# Patient Record
Sex: MALE | Race: WHITE | Employment: FULL TIME | URBAN - METROPOLITAN AREA
[De-identification: names, ages, dates, MRNs, and addresses within clinical notes are randomized per-mention and may not be internally consistent; named-entity substitution may affect disease eponyms.]

---

## 2017-01-01 ENCOUNTER — DOCUMENTATION ONLY (OUTPATIENT)
Dept: LAB | Facility: CLINIC | Age: 57
End: 2017-01-01

## 2017-01-01 ENCOUNTER — OFFICE VISIT (OUTPATIENT)
Dept: ORTHOPEDICS | Facility: CLINIC | Age: 57
End: 2017-01-01
Payer: COMMERCIAL

## 2017-01-01 ENCOUNTER — THERAPY VISIT (OUTPATIENT)
Dept: OCCUPATIONAL THERAPY | Facility: CLINIC | Age: 57
End: 2017-01-01
Payer: COMMERCIAL

## 2017-01-01 ENCOUNTER — RADIANT APPOINTMENT (OUTPATIENT)
Dept: GENERAL RADIOLOGY | Facility: CLINIC | Age: 57
End: 2017-01-01
Attending: FAMILY MEDICINE
Payer: COMMERCIAL

## 2017-01-01 ENCOUNTER — OFFICE VISIT (OUTPATIENT)
Dept: PEDIATRICS | Facility: CLINIC | Age: 57
End: 2017-01-01
Payer: COMMERCIAL

## 2017-01-01 ENCOUNTER — TELEPHONE (OUTPATIENT)
Dept: TRANSPLANT | Facility: CLINIC | Age: 57
End: 2017-01-01

## 2017-01-01 ENCOUNTER — CARE COORDINATION (OUTPATIENT)
Dept: NEPHROLOGY | Facility: CLINIC | Age: 57
End: 2017-01-01

## 2017-01-01 ENCOUNTER — OFFICE VISIT (OUTPATIENT)
Dept: NEPHROLOGY | Facility: CLINIC | Age: 57
End: 2017-01-01
Attending: INTERNAL MEDICINE
Payer: COMMERCIAL

## 2017-01-01 ENCOUNTER — TELEPHONE (OUTPATIENT)
Dept: NEPHROLOGY | Facility: CLINIC | Age: 57
End: 2017-01-01

## 2017-01-01 VITALS
HEIGHT: 65 IN | BODY MASS INDEX: 30.32 KG/M2 | HEART RATE: 82 BPM | DIASTOLIC BLOOD PRESSURE: 98 MMHG | SYSTOLIC BLOOD PRESSURE: 142 MMHG | WEIGHT: 182 LBS | OXYGEN SATURATION: 95 %

## 2017-01-01 VITALS
SYSTOLIC BLOOD PRESSURE: 124 MMHG | WEIGHT: 180 LBS | DIASTOLIC BLOOD PRESSURE: 82 MMHG | BODY MASS INDEX: 29.99 KG/M2 | HEIGHT: 65 IN

## 2017-01-01 VITALS
WEIGHT: 181 LBS | HEIGHT: 65 IN | BODY MASS INDEX: 30.16 KG/M2 | DIASTOLIC BLOOD PRESSURE: 82 MMHG | OXYGEN SATURATION: 96 % | TEMPERATURE: 97.9 F | SYSTOLIC BLOOD PRESSURE: 132 MMHG | HEART RATE: 76 BPM

## 2017-01-01 DIAGNOSIS — M79.644 THUMB PAIN, RIGHT: ICD-10-CM

## 2017-01-01 DIAGNOSIS — Z11.59 NEED FOR HEPATITIS C SCREENING TEST: ICD-10-CM

## 2017-01-01 DIAGNOSIS — M65.311 TRIGGER FINGER OF RIGHT THUMB: ICD-10-CM

## 2017-01-01 DIAGNOSIS — T86.10 COMPLICATION OF TRANSPLANTED KIDNEY, UNSPECIFIED COMPLICATION: ICD-10-CM

## 2017-01-01 DIAGNOSIS — I15.1 HYPERTENSION SECONDARY TO OTHER RENAL DISORDERS: ICD-10-CM

## 2017-01-01 DIAGNOSIS — Z94.0 KIDNEY REPLACED BY TRANSPLANT: ICD-10-CM

## 2017-01-01 DIAGNOSIS — E78.2 MIXED HYPERLIPIDEMIA: ICD-10-CM

## 2017-01-01 DIAGNOSIS — Z79.899 ENCOUNTER FOR LONG-TERM CURRENT USE OF MEDICATION: ICD-10-CM

## 2017-01-01 DIAGNOSIS — Z48.298 AFTERCARE FOLLOWING ORGAN TRANSPLANT: ICD-10-CM

## 2017-01-01 DIAGNOSIS — Z94.0 KIDNEY REPLACED BY TRANSPLANT: Primary | ICD-10-CM

## 2017-01-01 DIAGNOSIS — Z94.0 KIDNEY TRANSPLANTED: Primary | ICD-10-CM

## 2017-01-01 DIAGNOSIS — Z94.0 KIDNEY TRANSPLANTED: ICD-10-CM

## 2017-01-01 DIAGNOSIS — D84.9 IMMUNOSUPPRESSED STATUS (H): ICD-10-CM

## 2017-01-01 DIAGNOSIS — Z00.01 ENCOUNTER FOR GENERAL ADULT MEDICAL EXAMINATION WITH ABNORMAL FINDINGS: ICD-10-CM

## 2017-01-01 DIAGNOSIS — M65.4 RADIAL STYLOID TENOSYNOVITIS: Primary | ICD-10-CM

## 2017-01-01 DIAGNOSIS — M79.644 THUMB PAIN, RIGHT: Primary | ICD-10-CM

## 2017-01-01 DIAGNOSIS — M79.644 PAIN OF RIGHT THUMB: Primary | ICD-10-CM

## 2017-01-01 DIAGNOSIS — Z23 NEED FOR TDAP VACCINATION: ICD-10-CM

## 2017-01-01 DIAGNOSIS — I15.1 HYPERTENSION SECONDARY TO OTHER RENAL DISORDERS (CODE): Primary | ICD-10-CM

## 2017-01-01 DIAGNOSIS — B02.9 SHINGLES: ICD-10-CM

## 2017-01-01 DIAGNOSIS — M65.311 TRIGGER THUMB OF RIGHT HAND: ICD-10-CM

## 2017-01-01 DIAGNOSIS — I10 HYPERTENSION: ICD-10-CM

## 2017-01-01 DIAGNOSIS — E83.42 HYPOMAGNESEMIA: ICD-10-CM

## 2017-01-01 DIAGNOSIS — M79.644 PAIN OF RIGHT THUMB: ICD-10-CM

## 2017-01-01 DIAGNOSIS — E78.2 MIXED HYPERLIPIDEMIA: Primary | ICD-10-CM

## 2017-01-01 LAB
ANION GAP SERPL CALCULATED.3IONS-SCNC: 11 MMOL/L (ref 3–14)
ANION GAP SERPL CALCULATED.3IONS-SCNC: 13 MMOL/L (ref 3–14)
ANION GAP SERPL CALCULATED.3IONS-SCNC: 8 MMOL/L (ref 3–14)
ANION GAP SERPL CALCULATED.3IONS-SCNC: 9 MMOL/L (ref 3–14)
BUN SERPL-MCNC: 15 MG/DL (ref 7–30)
BUN SERPL-MCNC: 16 MG/DL (ref 7–30)
BUN SERPL-MCNC: 16 MG/DL (ref 7–30)
BUN SERPL-MCNC: 21 MG/DL (ref 7–30)
CALCIUM SERPL-MCNC: 9.1 MG/DL (ref 8.5–10.1)
CALCIUM SERPL-MCNC: 9.4 MG/DL (ref 8.5–10.1)
CALCIUM SERPL-MCNC: 9.4 MG/DL (ref 8.5–10.1)
CALCIUM SERPL-MCNC: 9.8 MG/DL (ref 8.5–10.1)
CHLORIDE SERPL-SCNC: 106 MMOL/L (ref 94–109)
CHLORIDE SERPL-SCNC: 107 MMOL/L (ref 94–109)
CHLORIDE SERPL-SCNC: 107 MMOL/L (ref 94–109)
CHLORIDE SERPL-SCNC: 108 MMOL/L (ref 94–109)
CO2 SERPL-SCNC: 21 MMOL/L (ref 20–32)
CO2 SERPL-SCNC: 22 MMOL/L (ref 20–32)
CO2 SERPL-SCNC: 23 MMOL/L (ref 20–32)
CO2 SERPL-SCNC: 25 MMOL/L (ref 20–32)
CREAT SERPL-MCNC: 1.19 MG/DL (ref 0.66–1.25)
CREAT SERPL-MCNC: 1.2 MG/DL (ref 0.66–1.25)
CREAT SERPL-MCNC: 1.26 MG/DL (ref 0.66–1.25)
CREAT SERPL-MCNC: 1.32 MG/DL (ref 0.66–1.25)
ERYTHROCYTE [DISTWIDTH] IN BLOOD BY AUTOMATED COUNT: 13.3 % (ref 10–15)
ERYTHROCYTE [DISTWIDTH] IN BLOOD BY AUTOMATED COUNT: 13.8 % (ref 10–15)
ERYTHROCYTE [DISTWIDTH] IN BLOOD BY AUTOMATED COUNT: 14.2 % (ref 10–15)
GFR SERPL CREATININE-BSD FRML MDRD: 56 ML/MIN/1.7M2
GFR SERPL CREATININE-BSD FRML MDRD: 59 ML/MIN/1.7M2
GFR SERPL CREATININE-BSD FRML MDRD: 63 ML/MIN/1.7M2
GFR SERPL CREATININE-BSD FRML MDRD: 63 ML/MIN/1.7M2
GLUCOSE SERPL-MCNC: 102 MG/DL (ref 70–99)
GLUCOSE SERPL-MCNC: 89 MG/DL (ref 70–99)
GLUCOSE SERPL-MCNC: 93 MG/DL (ref 70–99)
GLUCOSE SERPL-MCNC: 98 MG/DL (ref 70–99)
HCT VFR BLD AUTO: 41.8 % (ref 40–53)
HCT VFR BLD AUTO: 42.2 % (ref 40–53)
HCT VFR BLD AUTO: 42.9 % (ref 40–53)
HCV AB SERPL QL IA: NORMAL
HGB BLD-MCNC: 14 G/DL (ref 13.3–17.7)
HGB BLD-MCNC: 14.3 G/DL (ref 13.3–17.7)
HGB BLD-MCNC: 14.5 G/DL (ref 13.3–17.7)
MAGNESIUM SERPL-MCNC: 2.3 MG/DL (ref 1.6–2.3)
MCH RBC QN AUTO: 28.8 PG (ref 26.5–33)
MCH RBC QN AUTO: 29 PG (ref 26.5–33)
MCH RBC QN AUTO: 29.1 PG (ref 26.5–33)
MCHC RBC AUTO-ENTMCNC: 33.5 G/DL (ref 31.5–36.5)
MCHC RBC AUTO-ENTMCNC: 33.8 G/DL (ref 31.5–36.5)
MCHC RBC AUTO-ENTMCNC: 33.9 G/DL (ref 31.5–36.5)
MCV RBC AUTO: 85 FL (ref 78–100)
MCV RBC AUTO: 86 FL (ref 78–100)
MCV RBC AUTO: 87 FL (ref 78–100)
PLATELET # BLD AUTO: 191 10E9/L (ref 150–450)
PLATELET # BLD AUTO: 196 10E9/L (ref 150–450)
PLATELET # BLD AUTO: 199 10E9/L (ref 150–450)
POTASSIUM SERPL-SCNC: 4.1 MMOL/L (ref 3.4–5.3)
POTASSIUM SERPL-SCNC: 4.2 MMOL/L (ref 3.4–5.3)
POTASSIUM SERPL-SCNC: 4.3 MMOL/L (ref 3.4–5.3)
POTASSIUM SERPL-SCNC: 4.4 MMOL/L (ref 3.4–5.3)
PROT UR-MCNC: 0.12 G/L
PROT UR-MCNC: <0.05 G/L
PROT/CREAT 24H UR: 0.12 G/G CR (ref 0–0.2)
PROT/CREAT 24H UR: NORMAL G/G CR (ref 0–0.2)
RBC # BLD AUTO: 4.83 10E12/L (ref 4.4–5.9)
RBC # BLD AUTO: 4.91 10E12/L (ref 4.4–5.9)
RBC # BLD AUTO: 5.03 10E12/L (ref 4.4–5.9)
SIROLIMUS BLD-MCNC: 3.3 UG/L (ref 5–15)
SIROLIMUS BLD-MCNC: 4 UG/L (ref 5–15)
SIROLIMUS BLD-MCNC: 4.3 UG/L (ref 5–15)
SIROLIMUS BLD-MCNC: 7.3 UG/L (ref 5–15)
SODIUM SERPL-SCNC: 138 MMOL/L (ref 133–144)
SODIUM SERPL-SCNC: 140 MMOL/L (ref 133–144)
SODIUM SERPL-SCNC: 141 MMOL/L (ref 133–144)
SODIUM SERPL-SCNC: 141 MMOL/L (ref 133–144)
TME LAST DOSE: ABNORMAL H
TME LAST DOSE: NORMAL H
WBC # BLD AUTO: 4.8 10E9/L (ref 4–11)
WBC # BLD AUTO: 5.4 10E9/L (ref 4–11)
WBC # BLD AUTO: 6 10E9/L (ref 4–11)

## 2017-01-01 PROCEDURE — 99243 OFF/OP CNSLTJ NEW/EST LOW 30: CPT | Mod: 25 | Performed by: FAMILY MEDICINE

## 2017-01-01 PROCEDURE — 99213 OFFICE O/P EST LOW 20 MIN: CPT | Mod: 25 | Performed by: PEDIATRICS

## 2017-01-01 PROCEDURE — 97165 OT EVAL LOW COMPLEX 30 MIN: CPT | Mod: GO | Performed by: OCCUPATIONAL THERAPIST

## 2017-01-01 PROCEDURE — 80048 BASIC METABOLIC PNL TOTAL CA: CPT | Performed by: INTERNAL MEDICINE

## 2017-01-01 PROCEDURE — 97110 THERAPEUTIC EXERCISES: CPT | Mod: GO | Performed by: OCCUPATIONAL THERAPIST

## 2017-01-01 PROCEDURE — 97140 MANUAL THERAPY 1/> REGIONS: CPT | Mod: GO | Performed by: OCCUPATIONAL THERAPIST

## 2017-01-01 PROCEDURE — 76882 US LMTD JT/FCL EVL NVASC XTR: CPT | Performed by: FAMILY MEDICINE

## 2017-01-01 PROCEDURE — 36415 COLL VENOUS BLD VENIPUNCTURE: CPT | Performed by: INTERNAL MEDICINE

## 2017-01-01 PROCEDURE — 80195 ASSAY OF SIROLIMUS: CPT | Mod: 90 | Performed by: INTERNAL MEDICINE

## 2017-01-01 PROCEDURE — 85027 COMPLETE CBC AUTOMATED: CPT | Performed by: INTERNAL MEDICINE

## 2017-01-01 PROCEDURE — 80195 ASSAY OF SIROLIMUS: CPT | Performed by: INTERNAL MEDICINE

## 2017-01-01 PROCEDURE — 99396 PREV VISIT EST AGE 40-64: CPT | Mod: 25 | Performed by: PEDIATRICS

## 2017-01-01 PROCEDURE — 90715 TDAP VACCINE 7 YRS/> IM: CPT | Performed by: PEDIATRICS

## 2017-01-01 PROCEDURE — 97035 APP MDLTY 1+ULTRASOUND EA 15: CPT | Mod: GO | Performed by: OCCUPATIONAL THERAPIST

## 2017-01-01 PROCEDURE — 86803 HEPATITIS C AB TEST: CPT | Performed by: PEDIATRICS

## 2017-01-01 PROCEDURE — 36415 COLL VENOUS BLD VENIPUNCTURE: CPT | Performed by: PEDIATRICS

## 2017-01-01 PROCEDURE — 84156 ASSAY OF PROTEIN URINE: CPT | Performed by: INTERNAL MEDICINE

## 2017-01-01 PROCEDURE — 90471 IMMUNIZATION ADMIN: CPT | Performed by: PEDIATRICS

## 2017-01-01 PROCEDURE — 99212 OFFICE O/P EST SF 10 MIN: CPT | Mod: ZF

## 2017-01-01 PROCEDURE — 73130 X-RAY EXAM OF HAND: CPT | Mod: RT

## 2017-01-01 PROCEDURE — 83735 ASSAY OF MAGNESIUM: CPT | Performed by: INTERNAL MEDICINE

## 2017-01-01 PROCEDURE — 99000 SPECIMEN HANDLING OFFICE-LAB: CPT | Performed by: INTERNAL MEDICINE

## 2017-01-01 RX ORDER — MYCOPHENOLIC ACID 360 MG/1
360 TABLET, DELAYED RELEASE ORAL 2 TIMES DAILY
Qty: 180 TABLET | Refills: 3 | Status: SHIPPED | OUTPATIENT
Start: 2017-01-01

## 2017-01-01 RX ORDER — METOPROLOL TARTRATE 25 MG/1
TABLET, FILM COATED ORAL
Qty: 180 TABLET | Refills: 3 | Status: SHIPPED | OUTPATIENT
Start: 2017-01-01

## 2017-01-01 RX ORDER — SIROLIMUS 1 MG/1
1 TABLET, FILM COATED ORAL DAILY
Qty: 90 TABLET | Refills: 3 | Status: SHIPPED | OUTPATIENT
Start: 2017-01-01

## 2017-01-01 RX ORDER — ATORVASTATIN CALCIUM 10 MG/1
10 TABLET, FILM COATED ORAL DAILY
Qty: 90 TABLET | Refills: 3 | Status: SHIPPED | OUTPATIENT
Start: 2017-01-01

## 2017-01-01 RX ORDER — LISINOPRIL 20 MG/1
TABLET ORAL
Qty: 90 TABLET | Refills: 0 | OUTPATIENT
Start: 2017-01-01

## 2017-01-01 RX ORDER — ATORVASTATIN CALCIUM 10 MG/1
TABLET, FILM COATED ORAL
Qty: 90 TABLET | Refills: 0 | Status: SHIPPED
Start: 2017-01-01 | End: 2017-01-01

## 2017-01-01 RX ORDER — LOSARTAN POTASSIUM 100 MG/1
100 TABLET ORAL DAILY
Qty: 90 TABLET | Refills: 3 | Status: SHIPPED | OUTPATIENT
Start: 2017-01-01

## 2017-01-01 ASSESSMENT — PAIN SCALES - GENERAL: PAINLEVEL: NO PAIN (0)

## 2017-01-06 NOTE — TELEPHONE ENCOUNTER
Last Office Visit with Nephrologist:  1/17/16.  Medication refilled per Nephrology Clinic protocol.     Alissa Miller RN

## 2017-01-09 NOTE — MR AVS SNAPSHOT
After Visit Summary   1/9/2017    Tadeo Smith    MRN: [id number]           Patient Information     Date Of Birth          1960        Visit Information        Provider Department      1/9/2017 3:10 PM Ron Weinstein MD [facility]        Today's Diagnoses     Hypertension secondary to other renal disorders (CODE)    -  1     Kidney replaced by transplant         Hypomagnesemia            Follow-ups after your visit        Follow-up notes from your care team     Return in about 1 year (around 1/9/2018).      Your next 10 appointments already scheduled     Jan 09, 2017  3:10 PM   (Arrive by 2:55 PM)   Return Kidney Transplant with Ron Weinstein MD   [facility] ([facility])    [address] [place] [id number] [phone]            Jan 08, 2018  1:30 PM   (Arrive by 1:00 PM)   Return Kidney Transplant with Ron Weinstein MD   [facility] ([facility])    [address] [place] [id number] [phone]              Future tests that were ordered for you today     Open Future Orders        Priority Expected Expires Ordered    Basic metabolic panel Routine  2/8/2017 1/9/2017    Magnesium Routine  2/8/2017 1/9/2017            Who to contact     If you have questions or need follow up information about today's clinic visit or your schedule please contact [facility] directly at [phone].  Normal or non-critical lab and imaging results will be communicated to you by MyChart, letter or phone within 4 business days after the clinic has received the results. If you do not hear from us within 7 days, please contact the clinic through MyChart or phone. If you have a critical or abnormal lab result, we will notify you by phone as soon as possible.  Submit refill requests through [employer] or call your pharmacy and they will forward the refill request  "to us. Please allow 3 business days for your refill to be completed.          Additional Information About Your Visit        [employer] Information     [employer] gives you secure access to your electronic health record. If you see a primary care provider, you can also send messages to your care team and make appointments. If you have questions, please call your primary care clinic.  If you do not have a primary care provider, please call [phone] and they will assist you.        Care EveryWhere ID     This is your Care EveryWhere ID. This could be used by other organizations to access your [facility] medical records  [id number]        Your Vitals Were     Pulse Height BMI (Body Mass Index) Pulse Oximetry          82 1.651 m (5' 5\") 30.29 kg/m2 95%         Blood Pressure from Last 3 Encounters:   01/09/17 142/98   01/11/16 126/88   07/07/15 111/71    Weight from Last 3 Encounters:   01/09/17 82.555 kg (182 lb)   01/11/16 79.47 kg (175 lb 3.2 oz)   07/07/15 77.837 kg (171 lb 9.6 oz)                 Today's Medication Changes          These changes are accurate as of: 1/9/17  3:06 PM.  If you have any questions, ask your nurse or doctor.               Start taking these medicines.        Dose/Directions    losartan 100 MG tablet   Commonly known as:  COZAAR   Used for:  Hypertension secondary to other renal disorders (CODE)   Started by:  Ron Weinstein MD        Dose:  100 mg   Take 1 tablet (100 mg) by mouth daily   Quantity:  90 tablet   Refills:  3         Stop taking these medicines if you haven't already. Please contact your care team if you have questions.     lisinopril 20 MG tablet   Commonly known as:  PRINIVIL/ZESTRIL   Stopped by:  Ron Weinstein MD                Where to get your medicines      These medications were sent to [id number] [place] - [address]  [address], [place] [address]     Phone:  [phone]    - losartan 100 MG tablet             " Primary Care Provider Office Phone # Fax #    Jodi Caal [phone]       [address] [address]        Thank you!     Thank you for choosing [facility]  for your care. Our goal is always to provide you with excellent care. Hearing back from our patients is one way we can continue to improve our services. Please take a few minutes to complete the written survey that you may receive in the mail after your visit with us. Thank you!             Your Updated Medication List - Protect others around you: Learn how to safely use, store and throw away your medicines at www.disposemymeds.org.          This list is accurate as of: 1/9/17  3:06 PM.  Always use your most recent med list.                   Brand Name Dispense Instructions for use    atorvastatin 10 MG tablet    LIPITOR    90 tablet    TAKE ONE TABLET BY MOUTH ONCE DAILY       calcium + D 600-200 MG-UNIT Tabs   Generic drug:  calcium carbonate-vitamin D      Take 2 tablets by mouth 2 times daily.       losartan 100 MG tablet    COZAAR    90 tablet    Take 1 tablet (100 mg) by mouth daily       magnesium oxide 400 MG tablet    MAG-OX     Take 1 tablet by mouth daily.       metoprolol 25 MG tablet    LOPRESSOR    180 tablet    Take 1 tablet (25 mg) by mouth 2 times daily       multivitamin per tablet      Take 1 tablet by mouth daily.       mycophenolic acid EC tablet     180 tablet    Take 1 tablet (360 mg) by mouth 2 times daily       sirolimus 1 MG tablet    RAPAMUNE - GENERIC EQUIVALENT    90 tablet    Take 1 tablet (1 mg) by mouth daily

## 2017-01-09 NOTE — Clinical Note
"1/9/2017      RE: Tadeo Smith  [address]    [place] [address]       Assessment and Plan:  1. DDKT - baseline Cr ~ 1.3-1.5, which has remained stable.  Normal proteinuria.  Will make no changes in immunosuppression.  2. HTN - well controlled at target of less than 140/90.  Will change lisinopril to losartan 100 mg qHS.  Recommend repeat labs in 10-14 days.  Will then look to taper off metoprolol.  3. Anemia - normal Hgb.  Will follow.  4. Chronic diarrhea - stable with minimal symptoms.  Will follow.  5. Dyslipidemia - fair control and recent increase in atorvastatin.  6. Hypomagnesemia - will check serum magnesium level with next labs and continue on oral magnesium supplement.  7. Skin cancer risk - no new skin lesions.  Recommend regular follow up with Dermatology.  8. Recommend return visit in 12 months.    Assessment and plan was discussed with patient and he voiced his understanding and agreement.    Reason for Visit:  Mr. Smith is here for routine follow up.    HPI:   Tadeo Smith is a 56 year old male with ESKD from unclear etiology and is status post DDKT on 1/10/07.         Transplant Hx:       Tx: DDKT  Date: 1/10/07       Present Maintenance IS: Mycophenolic acid and Sirolimus       Baseline Creatinine: 1.3-1.5    Mr. Smith reports feeling good overall with minimal medical complaints.  His energy level has been good and remains normal to even better than last year.  He is active and does get some exercise.  Denies any chest pain or shortness of breath with exertion.  Appetite is \"too good\" and he has gained about 10 lbs.  No nausea or vomiting.  Occasional loose stools.  No fever, sweats or chills.  No leg swelling.  Of note, patient previously ran out of his metoprolol for a short period of time and noticed that his mood was better off it.  He would like to be able to come off it altogether.    Home BP: 120/70s.      ROS:   A comprehensive review of systems was obtained and " negative, except as noted in the HPI or PMH.    Active Medical Problems:  Patient Active Problem List   Diagnosis     Cerebral infarction (H)     Kidney replaced by transplant     Immunosuppressed status (H)     Dyslipidemia     Hypertension secondary to other renal disorders     Hypomagnesemia     Aftercare following organ transplant     Personal Hx:  Social History     Social History     Marital Status: Single     Spouse Name: N/A     Number of Children: 0     Years of Education: N/A     Occupational History     rehab counselor      Social History Main Topics     Smoking status: Never Smoker      Smokeless tobacco: Never Used     Alcohol Use: 0.5 - 1.0 oz/week     1-2 Standard drinks or equivalent per week      Comment: few per week     Drug Use: No     Sexual Activity: Yes     Other Topics Concern     Not on file     Social History Narrative     Allergies:  No Known Allergies  Medications:  Prior to Admission medications    Medication Sig Start Date End Date Taking? Authorizing Provider   atorvastatin (LIPITOR) 20 MG tablet Take 1 tablet by mouth daily. 1/17/13  Yes Ron Weinstein MD   buPROPion (WELLBUTRIN) 100 MG tablet Take 1 tablet by mouth 2 times daily. 9/11/12  Yes [facility],    sirolimus (RAPAMUNE) 1 MG tablet Take 3 tablets by mouth daily. 6/25/12  Yes Ron Weinstein MD   co-enzyme Q-10 100 MG CAPS Take 1 capsule by mouth daily. 6/9/12  Yes [facility],    ASPIRIN NOT PRESCRIBED, INTENTIONAL, by Other route continuous prn. 5/2/11  Yes Kwadwo Olsen MD   Calcium Carbonate-Vitamin D (CALCIUM + D) 600-200 MG-UNIT per tablet Take 2 tablets by mouth 2 times daily. 3/28/11  Yes Kwadwo Olsen MD   magnesium oxide (MAG-OX) 400 MG tablet Take 1 tablet by mouth daily. 3/28/11  Yes Kwadwo Olsen MD   Multiple Vitamin (MULTIVITAMIN) per tablet Take 1 tablet by mouth daily. 3/28/11  Yes Kwadwo Olsen MD   fish oil-omega-3 fatty acids 1000 MG capsule Take 2 capsules by  "mouth daily. 3/28/11  Yes Kwadwo Olsen MD   metoprolol (LOPRESSOR) 50 MG tablet Take 1 tablet by mouth 2 times daily. 2/7/13   Ron Weinstein MD   lisinopril (PRINIVIL,ZESTRIL) 20 MG tablet Take 1 tablet by mouth daily. 2/7/13   Ron Weinstein MD   mycophenolate (CELLCEPT-GENERIC EQUIVALENT) 250 MG capsule Take 1 capsule by mouth 2 times daily. 1/24/13   Darryl Soriano MD     Vitals:  /98 mmHg  Pulse 82  Ht 1.651 m (5' 5\")  Wt 82.555 kg (182 lb)  BMI 30.29 kg/m2  SpO2 95%    Exam:   GENERAL APPEARANCE: alert and no distress  HENT: mouth without ulcers or lesions  LYMPHATICS: no cervical or supraclavicular nodes  RESP: lungs clear to auscultation - no rales, rhonchi or wheezes  CV: regular rhythm, normal rate, no rub, no murmur  EDEMA: no LE edema bilaterally  ABDOMEN: soft, nontender, bowel sounds normal  MS: extremities normal - no gross deformities noted, no evidence of inflammation in joints, no muscle tenderness  SKIN: no rash  TX KIDNEY: normal    Results:   Recent Results (from the past 672 hour(s))   CBC with platelets    Collection Time: 01/05/17  7:32 AM   Result Value Ref Range    WBC 5.4 4.0 - 11.0 10e9/L    RBC Count 5.03 4.4 - 5.9 10e12/L    Hemoglobin 14.5 13.3 - 17.7 g/dL    Hematocrit 42.9 40.0 - 53.0 %    MCV 85 78 - 100 fl    MCH 28.8 26.5 - 33.0 pg    MCHC 33.8 31.5 - 36.5 g/dL    RDW 13.8 10.0 - 15.0 %    Platelet Count 196 150 - 450 10e9/L   Basic metabolic panel    Collection Time: 01/05/17  7:32 AM   Result Value Ref Range    Sodium 141 133 - 144 mmol/L    Potassium 4.1 3.4 - 5.3 mmol/L    Chloride 107 94 - 109 mmol/L    Carbon Dioxide 21 20 - 32 mmol/L    Anion Gap 13 3 - 14 mmol/L    Glucose 89 70 - 99 mg/dL    Urea Nitrogen 21 7 - 30 mg/dL    Creatinine 1.32 (H) 0.66 - 1.25 mg/dL    GFR Estimate 56 (L) >60 mL/min/1.7m2    GFR Estimate If Black 68 >60 mL/min/1.7m2    Calcium 9.4 8.5 - 10.1 mg/dL   Sirolimus level    Collection Time: 01/05/17  7:32 AM   Result Value Ref " Range    Sirolimus Last Dose 0600 01/04/2017     Sirolimus Level 7.3 5.0 - 15.0 ug/L     Ron Weinstein MD

## 2017-01-09 NOTE — NURSING NOTE
"Chief Complaint   Patient presents with     RECHECK     1 year follow up for kidney Tx       Initial /98 mmHg  Pulse 82  Ht 1.651 m (5' 5\")  Wt 82.555 kg (182 lb)  BMI 30.29 kg/m2  SpO2 95% Estimated body mass index is 30.29 kg/(m^2) as calculated from the following:    Height as of this encounter: 1.651 m (5' 5\").    Weight as of this encounter: 82.555 kg (182 lb).  BP completed using cuff size: michael Rudolph CMA    "

## 2017-01-15 PROBLEM — Z48.298 AFTERCARE FOLLOWING ORGAN TRANSPLANT: Status: ACTIVE | Noted: 2017-01-01

## 2017-01-15 NOTE — PROGRESS NOTES
"Assessment and Plan:  1. DDKT - baseline Cr ~ 1.3-1.5, which has remained stable.  Normal proteinuria.  Will make no changes in immunosuppression.  2. HTN - well controlled at target of less than 140/90.  Will change lisinopril to losartan 100 mg qHS.  Recommend repeat labs in 10-14 days.  Will then look to taper off metoprolol.  3. Anemia - normal Hgb.  Will follow.  4. Chronic diarrhea - stable with minimal symptoms.  Will follow.  5. Dyslipidemia - fair control and recent increase in atorvastatin.  6. Hypomagnesemia - will check serum magnesium level with next labs and continue on oral magnesium supplement.  7. Skin cancer risk - no new skin lesions.  Recommend regular follow up with Dermatology.  8. Recommend return visit in 12 months.    Assessment and plan was discussed with patient and he voiced his understanding and agreement.    Reason for Visit:  Mr. Smith is here for routine follow up.    HPI:   Tadeo Smith is a 56 year old male with ESKD from unclear etiology and is status post DDKT on 1/10/07.         Transplant Hx:       Tx: DDKT  Date: 1/10/07       Present Maintenance IS: Mycophenolic acid and Sirolimus       Baseline Creatinine: 1.3-1.5    Mr. Smith reports feeling good overall with minimal medical complaints.  His energy level has been good and remains normal to even better than last year.  He is active and does get some exercise.  Denies any chest pain or shortness of breath with exertion.  Appetite is \"too good\" and he has gained about 10 lbs.  No nausea or vomiting.  Occasional loose stools.  No fever, sweats or chills.  No leg swelling.  Of note, patient previously ran out of his metoprolol for a short period of time and noticed that his mood was better off it.  He would like to be able to come off it altogether.    Home BP: 120/70s.      ROS:   A comprehensive review of systems was obtained and negative, except as noted in the HPI or PMH.    Active Medical Problems:  Patient Active Problem " List   Diagnosis     Cerebral infarction (H)     Kidney replaced by transplant     Immunosuppressed status (H)     Dyslipidemia     Hypertension secondary to other renal disorders     Hypomagnesemia     Aftercare following organ transplant     Personal Hx:  Social History     Social History     Marital Status: Single     Spouse Name: N/A     Number of Children: 0     Years of Education: N/A     Occupational History     rehab counselor      Social History Main Topics     Smoking status: Never Smoker      Smokeless tobacco: Never Used     Alcohol Use: 0.5 - 1.0 oz/week     1-2 Standard drinks or equivalent per week      Comment: few per week     Drug Use: No     Sexual Activity: Yes     Other Topics Concern     Not on file     Social History Narrative     Allergies:  No Known Allergies  Medications:  Prior to Admission medications    Medication Sig Start Date End Date Taking? Authorizing Provider   atorvastatin (LIPITOR) 20 MG tablet Take 1 tablet by mouth daily. 1/17/13  Yes Ron Weinstein MD   buPROPion (WELLBUTRIN) 100 MG tablet Take 1 tablet by mouth 2 times daily. 9/11/12  Yes [facility],    sirolimus (RAPAMUNE) 1 MG tablet Take 3 tablets by mouth daily. 6/25/12  Yes Ron Weinstein MD   co-enzyme Q-10 100 MG CAPS Take 1 capsule by mouth daily. 6/9/12  Yes [facility],    ASPIRIN NOT PRESCRIBED, INTENTIONAL, by Other route continuous prn. 5/2/11  Yes Kwadwo Olsen MD   Calcium Carbonate-Vitamin D (CALCIUM + D) 600-200 MG-UNIT per tablet Take 2 tablets by mouth 2 times daily. 3/28/11  Yes Kwadwo Olsen MD   magnesium oxide (MAG-OX) 400 MG tablet Take 1 tablet by mouth daily. 3/28/11  Yes Kwadwo Olsen MD   Multiple Vitamin (MULTIVITAMIN) per tablet Take 1 tablet by mouth daily. 3/28/11  Yes Kwadwo Olsen MD   fish oil-omega-3 fatty acids 1000 MG capsule Take 2 capsules by mouth daily. 3/28/11  Yes Kwadwo Olsen MD   metoprolol (LOPRESSOR) 50 MG tablet Take 1  "tablet by mouth 2 times daily. 2/7/13   Ron Weinstein MD   lisinopril (PRINIVIL,ZESTRIL) 20 MG tablet Take 1 tablet by mouth daily. 2/7/13   Ron Weinstein MD   mycophenolate (CELLCEPT-GENERIC EQUIVALENT) 250 MG capsule Take 1 capsule by mouth 2 times daily. 1/24/13   Darryl Soriano MD     Vitals:  /98 mmHg  Pulse 82  Ht 1.651 m (5' 5\")  Wt 82.555 kg (182 lb)  BMI 30.29 kg/m2  SpO2 95%    Exam:   GENERAL APPEARANCE: alert and no distress  HENT: mouth without ulcers or lesions  LYMPHATICS: no cervical or supraclavicular nodes  RESP: lungs clear to auscultation - no rales, rhonchi or wheezes  CV: regular rhythm, normal rate, no rub, no murmur  EDEMA: no LE edema bilaterally  ABDOMEN: soft, nontender, bowel sounds normal  MS: extremities normal - no gross deformities noted, no evidence of inflammation in joints, no muscle tenderness  SKIN: no rash  TX KIDNEY: normal    Results:   Recent Results (from the past 672 hour(s))   CBC with platelets    Collection Time: 01/05/17  7:32 AM   Result Value Ref Range    WBC 5.4 4.0 - 11.0 10e9/L    RBC Count 5.03 4.4 - 5.9 10e12/L    Hemoglobin 14.5 13.3 - 17.7 g/dL    Hematocrit 42.9 40.0 - 53.0 %    MCV 85 78 - 100 fl    MCH 28.8 26.5 - 33.0 pg    MCHC 33.8 31.5 - 36.5 g/dL    RDW 13.8 10.0 - 15.0 %    Platelet Count 196 150 - 450 10e9/L   Basic metabolic panel    Collection Time: 01/05/17  7:32 AM   Result Value Ref Range    Sodium 141 133 - 144 mmol/L    Potassium 4.1 3.4 - 5.3 mmol/L    Chloride 107 94 - 109 mmol/L    Carbon Dioxide 21 20 - 32 mmol/L    Anion Gap 13 3 - 14 mmol/L    Glucose 89 70 - 99 mg/dL    Urea Nitrogen 21 7 - 30 mg/dL    Creatinine 1.32 (H) 0.66 - 1.25 mg/dL    GFR Estimate 56 (L) >60 mL/min/1.7m2    GFR Estimate If Black 68 >60 mL/min/1.7m2    Calcium 9.4 8.5 - 10.1 mg/dL   Sirolimus level    Collection Time: 01/05/17  7:32 AM   Result Value Ref Range    Sirolimus Last Dose 0600 01/04/2017     Sirolimus Level 7.3 5.0 - 15.0 ug/L       "

## 2017-01-17 NOTE — PROGRESS NOTES
"Reason for Call    Follow up BP. His blood pressure today was 131/74 with a pulse of 59. Said his average is \"high normal\" - 120-130's / 80's. He checks it every other day, runs higher in the evening. Has recently lost 3 pounds (gained 10 pounds so far this winter). Otherwise denied symptoms.     Currently prescribed losartan 100mg daily and metoprolol 25mg BID.    Collaboration    Dr. Weinstein updated, no changes at this time.    Plan    1. Continue medications as prescribed.    Patient was given an opportunity to ask questions and have those questions answered to his satisfaction.  Patient verbalized understanding of instructions provided and agreed to plan of care.       "

## 2017-01-24 NOTE — TELEPHONE ENCOUNTER
Lisinopril was discontinued at 1/15/17 appointment and changed to losartan. Refill denied.    Alissa Miller RN

## 2017-03-20 NOTE — TELEPHONE ENCOUNTER
Drug name: myfortic 360mg  Last fill: 02/17/17  Last quantity: 180    Anabella Munguia  [facility]

## 2017-04-07 NOTE — TELEPHONE ENCOUNTER
Last Office Visit with Nephrologist:  1/15/2017.  Medication refilled per Nephrology Clinic protocol.    Jose Becker RN

## 2017-05-02 NOTE — TELEPHONE ENCOUNTER
Issue: Sirolimus level is low at 3.3.  Previous levels have been therapeutic.    Plan:  It is unclear in EPIC if this was a good trough level.  Is this a good 12 hour trough level?   We will not change dose at this time but please recheck sirolimus level next week.  If it is still low, we may need to increase sirolimus.

## 2017-05-02 NOTE — TELEPHONE ENCOUNTER
Call placed to patient: Patient states that this level was likely longer than twenty-four hours. Patient verbalize understanding to recheck level next week ensuring a good lab draw. Order placed

## 2017-05-03 NOTE — TELEPHONE ENCOUNTER
Medication refill per [facility] protocol.  Lipitor  Associated DX: Hyperlidpidemia  Last OV: 1/9/17  Next appointment: 1/8/18  Shama Olivo LPN  Nephrology  [facility]  [phone]

## 2017-05-18 NOTE — PROGRESS NOTES
"SUBJECTIVE:     CC: Tadeo Smith is an 57 year old male who presents for preventative health visit.     Physical   Annual:     Getting at least 3 servings of Calcium per day::  Yes    Bi-annual eye exam::  Yes    Dental care twice a year::  NO    Sleep apnea or symptoms of sleep apnea::  None    Diet::  Regular (no restrictions)    Frequency of exercise::  2-3 days/week    Duration of exercise::  Less than 15 minutes    Taking medications regularly::  Yes    Medication side effects::  None    Additional concerns today::  YES    Right thumb pain - woke up with pain and \"ratcheting\" feeling of right thumb DIP joint one months ago  It has been hard to fully bend his thumb.  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 Very slowly improving, but he is concerned he can't bend his thumb.          Today's PHQ-2 Score:   PHQ-2 ( 1999 Pfizer) 5/18/2017   Q1: Little interest or pleasure in doing things -   Q2: Feeling down, depressed or hopeless -   PHQ-2 Score -   Little interest or pleasure in doing things Not at all   Feeling down, depressed or hopeless Not at all   PHQ-2 Score 0       Abuse: Current or Past(Physical, Sexual or Emotional)- No  Do you feel safe in your environment - Yes    Social History   Substance Use Topics     Smoking status: Never Smoker     Smokeless tobacco: Never Used     Alcohol use 0.5 - 1.0 oz/week     1 - 2 Standard drinks or equivalent per week      Comment: few per week     The patient does not drink >3 drinks per day nor >7 drinks per week.    Last PSA: No results found for: PSA    Recent Labs   Lab Test  10/20/16   0736  07/01/15   0920  12/31/14   0735   CHOL  218*  179  189   HDL  46  57  62   LDL  143*  102  98   TRIG  147  100  143   CHOLHDLRATIO   --   3.1  3.0   NHDL  172*   --    --        Reviewed orders with patient. Reviewed health maintenance and updated orders accordingly - Yes    Reviewed and updated as " "needed this visit by clinical staff  Tobacco  Allergies  Meds  Med Hx  Surg Hx  Fam Hx  Soc Hx        Reviewed and updated as needed this visit by Provider         ROS:  C: NEGATIVE for fever, chills, change in weight  I: NEGATIVE for worrisome rashes, moles or lesions  E: NEGATIVE for vision changes or irritation  ENT: NEGATIVE for ear, mouth and throat problems  R: NEGATIVE for significant cough or SOB  CV: NEGATIVE for chest pain, palpitations or peripheral edema  GI: NEGATIVE for nausea, abdominal pain, heartburn, or change in bowel habits   male: negative for dysuria, hematuria, decreased urinary stream, erectile dysfunction, urethral discharge  M: hpi  N: NEGATIVE for weakness, dizziness or paresthesias  P: NEGATIVE for changes in mood or affect    Problem list, Medication list, Allergies, and Medical/Social/Surgical histories reviewed in [facility] and updated as appropriate.  OBJECTIVE:     /82 (BP Location: Right arm, Cuff Size: Adult Large)  Pulse 76  Temp 97.9  F (36.6  C) (Oral)  Ht 5' 5\" (1.651 m)  Wt 181 lb (82.1 kg)  SpO2 96%  BMI 30.12 kg/m2  EXAM:  GENERAL: healthy, alert and no distress  EYES: Eyes grossly normal to inspection, PERRL and conjunctivae and sclerae normal  HENT: ear canals and TM's normal, nose and mouth without ulcers or lesions  NECK: no adenopathy, no asymmetry, masses, or scars and thyroid normal to palpation  RESP: lungs clear to auscultation - no rales, rhonchi or wheezes  CV: regular rate and rhythm, normal S1 S2, no S3 or S4, no murmur, click or rub, no peripheral edema and peripheral pulses strong  ABDOMEN: soft, nontender, no hepatosplenomegaly, no masses and bowel sounds normal  MS: right thumb with mild radial tendon tenderness, cannot actively bend DIP joint  SKIN: no suspicious lesions or rashes  NEURO: Normal strength and tone, mentation intact and speech normal  PSYCH: mentation appears normal, affect normal/bright    ASSESSMENT/PLAN:     1. Radial " "styloid tenosynovitis  Continue with [facility] with brace, tylenol prn and ergonomic adjustments - ortho referral if symptoms do not continue to improve  - ORTHO  REFERRAL  - OFFICE/OUTPT VISIT,JOSE ANTONIO ANDERSON III    2. Encounter for general adult medical examination with abnormal findings    3. Need for hepatitis C screening test  - Hepatitis C antibody; Future    COUNSELING:   Reviewed preventive health counseling, as reflected in patient instructions       Regular exercise       Healthy diet/nutrition       Consider Hep C screening for patients born between 1945 and 1965         reports that he has never smoked. He has never used smokeless tobacco.    Estimated body mass index is 30.12 kg/(m^2) as calculated from the following:    Height as of this encounter: 5' 5\" (1.651 m).    Weight as of this encounter: 181 lb (82.1 kg).   Weight management plan: Discussed healthy diet and exercise guidelines and patient will follow up in 12 months in clinic to re-evaluate.    Counseling Resources:  ATP IV Guidelines  Pooled Cohorts Equation Calculator  FRAX Risk Assessment  ICSI Preventive Guidelines  Dietary Guidelines for Americans, 2010  USDA's MyPlate  ASA Prophylaxis  Lung CA Screening    Jodi Caal MD  [facility]  "

## 2017-05-18 NOTE — NURSING NOTE
"Chief Complaint   Patient presents with     Physical     Thumb Discomfort       Initial /82 (BP Location: Right arm, Cuff Size: Adult Large)  Pulse 76  Temp 97.9  F (36.6  C) (Oral)  Ht 5' 5\" (1.651 m)  Wt 181 lb (82.1 kg)  SpO2 96%  BMI 30.12 kg/m2 Estimated body mass index is 30.12 kg/(m^2) as calculated from the following:    Height as of this encounter: 5' 5\" (1.651 m).    Weight as of this encounter: 181 lb (82.1 kg).  Medication Reconciliation: complete   Yudith Parekh MA    "

## 2017-05-18 NOTE — MR AVS SNAPSHOT
After Visit Summary   5/18/2017    Tadeo Smith    MRN: [id number]           Patient Information     Date Of Birth          1960        Visit Information        Provider Department      5/18/2017 1:00 PM Jodi Caal MD [facility]        Today's Diagnoses     Radial styloid tenosynovitis    -  1      Care Instructions    You will be called to schedule ortho appt.      Preventive Health Recommendations  Male Ages 50 - 64    Yearly exam:             See your health care provider every year in order to  o   Review health changes.   o   Discuss preventive care.    o   Review your medicines if your doctor has prescribed any.     Have a cholesterol test every 5 years, or more frequently if you are at risk for high cholesterol/heart disease.     Have a diabetes test (fasting glucose) every three years. If you are at risk for diabetes, you should have this test more often.     Have a colonoscopy at age 50, or have a yearly FIT test (stool test). These exams will check for colon cancer.      Talk with your health care provider about whether or not a prostate cancer screening test (PSA) is right for you.    You should be tested each year for STDs (sexually transmitted diseases), if you re at risk.     Shots: Get a flu shot each year. Get a tetanus shot every 10 years.     Nutrition:    Eat at least 5 servings of fruits and vegetables daily.     Eat whole-grain bread, whole-wheat pasta and brown rice instead of white grains and rice.     Talk to your provider about Calcium and Vitamin D.     Lifestyle    Exercise for at least 150 minutes a week (30 minutes a day, 5 days a week). This will help you control your weight and prevent disease.     Limit alcohol to one drink per day.     No smoking.     Wear sunscreen to prevent skin cancer.     See your dentist every six months for an exam and cleaning.     See your eye doctor every 1 to 2 years.    De Quervain Tenosynovitis    De Quervain  tenosynovitis is inflammation of tendons and synovium on the thumb side of the wrist. Tendons are fibers that attach muscle to bone. Synovium is a slick membrane that helps tendons move. Movements done over and over can irritate and inflame these tissues. This can cause pain when you touch or grasp objects, turn or twist your wrist, or make a fist. You may also have pain and swelling near the base of the thumb or numbness along the back of your thumb and index finger. You may also feel the thumb catch or snap when you move it.  Treatment will depend on how bad the pain is. It can often be treated with medicines and home care. If your case is severe, you may be referred to a specialist to talk about surgery.  Home care  Your health care provider may prescribe medicines to relieve pain and reduce inflammation. A steroid medicine may be injected near the tendons. This reduces swelling. The provider may also suggest taking over-the-counter medicines like ibuprofen or naproxen. These help reduce inflammation. Take all medicines only as directed.  The following are general care guidelines:    Avoid repetitive movements of your wrist and thumb.    Note any activity that causes pain or swelling. If possible, avoid or limit that activity.    Put a cold pack on your thumb. You can make your own cold pack by wrapping a plastic bag of ice or bag of frozen vegetables in a thin towel. Hold this to your thumb for up to 20 minutes at a time. Don't put ice directly on the skin.    The provider may put a splint on the thumb to hold it still. Use the splint as you have been told. In some cases, you may need to use a splint 24 hours a day for 4 to 6 weeks. This will allow the wrist and thumb to heal.  Follow-up care  Follow up with your health care provider, or as advised. You may need more treatment if your injury is severe or if your symptoms don't get better. This additional treatment may include local injections, physical therapy,  and surgery.  When to seek medical advice  Call your health care provider right away if any of these occur:    Increase in pain or swelling    Symptoms get worse after taking medicine    Pain spreads farther down the thumb or into the forearm    Pain continues to get in the way of daily life    7813-2883 The [employer]. [address] [place] [address]. All rights reserved. This information is not intended as a substitute for professional medical care. Always follow your healthcare professional's instructions.        Treating De Quervain Tenosynovitis  The goal of your treatment is to relieve your pain and allow you to use your thumb again. Treatment will depend on how severe the pain is.  Nonsurgical Treatment  Just taking a break from the activities that caused your pain may be enough. Your healthcare provider may also have you take oral nonsteroidal, anti-inflammatory medicine (NSAIDs), such as ibuprofen, or wear a splint for a few weeks to rest the thumb and wrist. To reduce the swelling, your healthcare provider may inject an anti-inflammatory medicine, such as cortisone, around the tendons. You may have more pain at first, but in a few days your thumb should feel better.    Surgery  If other kinds of treatment don t relieve your pain, or if the pain is severe, your healthcare provider may recommend surgery. The sheath that surrounds the tendons is released so the tendons can move more easily. This helps reduce the inflammation, and allows you to straighten your thumb without pain. Usually, surgery takes a few minutes and is done with local anesthetic, so you can go home the same day. You will probably have a splint or dressing on your wrist for a few days while the tissue heals. Your healthcare provider will discuss the risks and possible complications of surgery with you.    6547-0133 [employer]. [address] [place] [address]. All rights reserved. This  information is not intended as a substitute for professional medical care. Always follow your healthcare professional's instructions.              Follow-ups after your visit        Additional Services     ORTHO  REFERRAL       Coverage of these services is subject to the terms and limitations of your health insurance plan. Please call member services at your health plan with any benefit or coverage questions.       [facility] is referring you to the Orthopedic  Services at [facility].       The  representative will assist you in the coordination of your orthopedic and musculoskeletal care as prescribed by your physician.    The  representative will call you within 24 hours or   You may contact the  representative at:   [phone] for [facility]  [phone] for [facility], and [facility]  [phone] for [facility]    Type of Referral : Non Surgical       Timeframe requested: Routine       If X-rays, CT or MRI's   have been performed, please contact the facility where they were done, to arrange for  prior to your scheduled appointment. Please bring this referral request to your appointment and present it to your specialist.                  Your next 10 appointments already scheduled     Jan 08, 2018  1:30 PM CST   (Arrive by 1:00 PM)   Return Kidney Transplant with Ron Weinstein MD   [facility] ([facility])    [address] [place] [id number] [phone]              Who to contact     If you have questions or need follow up information about today's clinic visit or your schedule please contact [facility] directly at [phone].  Normal or non-critical lab and imaging results will be communicated to you by MyChart, letter or phone within 4 business days after the clinic has received the results. If you do not hear  "from us within 7 days, please contact the clinic through [employer] or phone. If you have a critical or abnormal lab result, we will notify you by phone as soon as possible.  Submit refill requests through [employer] or call your pharmacy and they will forward the refill request to us. Please allow 3 business days for your refill to be completed.          Additional Information About Your Visit        [employer] Information     [employer] gives you secure access to your electronic health record. If you see a primary care provider, you can also send messages to your care team and make appointments. If you have questions, please call your primary care clinic.  If you do not have a primary care provider, please call [phone] and they will assist you.        Care EveryWhere ID     This is your Care EveryWhere ID. This could be used by other organizations to access your [facility] medical records  [id number]        Your Vitals Were     Pulse Temperature Height Pulse Oximetry BMI (Body Mass Index)       76 97.9  F (36.6  C) (Oral) 5' 5\" (1.651 m) 96% 30.12 kg/m2        Blood Pressure from Last 3 Encounters:   05/18/17 132/82   01/09/17 (!) 142/98   01/11/16 126/88    Weight from Last 3 Encounters:   05/18/17 181 lb (82.1 kg)   01/09/17 182 lb (82.6 kg)   01/11/16 175 lb 3.2 oz (79.5 kg)              We Performed the Following     ORTHO  REFERRAL        Primary Care Provider Office Phone # Fax #    Jodi Caal [phone]       [facility] [address] [place] [address]        Thank you!     Thank you for choosing [facility]  for your care. Our goal is always to provide you with excellent care. Hearing back from our patients is one way we can continue to improve our services. Please take a few minutes to complete the written survey that you may receive in the mail after your visit with us. Thank you!             Your Updated Medication List - Protect others " around you: Learn how to safely use, store and throw away your medicines at www.disposemymeds.org.          This list is accurate as of: 5/18/17  1:27 PM.  Always use your most recent med list.                   Brand Name Dispense Instructions for use    atorvastatin 10 MG tablet    LIPITOR    90 tablet    Take 1 tablet (10 mg) by mouth daily       calcium + D 600-200 MG-UNIT Tabs   Generic drug:  calcium carbonate-vitamin D      Take 2 tablets by mouth 2 times daily.       losartan 100 MG tablet    COZAAR    90 tablet    Take 1 tablet (100 mg) by mouth daily       magnesium oxide 400 MG tablet    MAG-OX     Take 1 tablet by mouth daily.       metoprolol 25 MG tablet    LOPRESSOR    180 tablet    TAKE ONE TABLET BY MOUTH TWICE DAILY       multivitamin per tablet      Take 1 tablet by mouth daily.       mycophenolic acid EC tablet     180 tablet    Take 1 tablet (360 mg) by mouth 2 times daily       sirolimus 1 MG tablet    RAPAMUNE - GENERIC EQUIVALENT    90 tablet    Take 1 tablet (1 mg) by mouth daily

## 2017-05-18 NOTE — PATIENT INSTRUCTIONS
You will be called to schedule ortho appt.      Preventive Health Recommendations  Male Ages 50   64    Yearly exam:             See your health care provider every year in order to  o   Review health changes.   o   Discuss preventive care.    o   Review your medicines if your doctor has prescribed any.     Have a cholesterol test every 5 years, or more frequently if you are at risk for high cholesterol/heart disease.     Have a diabetes test (fasting glucose) every three years. If you are at risk for diabetes, you should have this test more often.     Have a colonoscopy at age 50, or have a yearly FIT test (stool test). These exams will check for colon cancer.      Talk with your health care provider about whether or not a prostate cancer screening test (PSA) is right for you.    You should be tested each year for STDs (sexually transmitted diseases), if you re at risk.     Shots: Get a flu shot each year. Get a tetanus shot every 10 years.     Nutrition:    Eat at least 5 servings of fruits and vegetables daily.     Eat whole-grain bread, whole-wheat pasta and brown rice instead of white grains and rice.     Talk to your provider about Calcium and Vitamin D.     Lifestyle    Exercise for at least 150 minutes a week (30 minutes a day, 5 days a week). This will help you control your weight and prevent disease.     Limit alcohol to one drink per day.     No smoking.     Wear sunscreen to prevent skin cancer.     See your dentist every six months for an exam and cleaning.     See your eye doctor every 1 to 2 years.    De Quervain Tenosynovitis    De Quervain tenosynovitis is inflammation of tendons and synovium on the thumb side of the wrist. Tendons are fibers that attach muscle to bone. Synovium is a slick membrane that helps tendons move. Movements done over and over can irritate and inflame these tissues. This can cause pain when you touch or grasp objects, turn or twist your wrist, or make a fist. You may also  have pain and swelling near the base of the thumb or numbness along the back of your thumb and index finger. You may also feel the thumb catch or snap when you move it.  Treatment will depend on how bad the pain is. It can often be treated with medicines and home care. If your case is severe, you may be referred to a specialist to talk about surgery.  Home care  Your health care provider may prescribe medicines to relieve pain and reduce inflammation. A steroid medicine may be injected near the tendons. This reduces swelling. The provider may also suggest taking over-the-counter medicines like ibuprofen or naproxen. These help reduce inflammation. Take all medicines only as directed.  The following are general care guidelines:    Avoid repetitive movements of your wrist and thumb.    Note any activity that causes pain or swelling. If possible, avoid or limit that activity.    Put a cold pack on your thumb. You can make your own cold pack by wrapping a plastic bag of ice or bag of frozen vegetables in a thin towel. Hold this to your thumb for up to 20 minutes at a time. Don't put ice directly on the skin.    The provider may put a splint on the thumb to hold it still. Use the splint as you have been told. In some cases, you may need to use a splint 24 hours a day for 4 to 6 weeks. This will allow the wrist and thumb to heal.  Follow-up care  Follow up with your health care provider, or as advised. You may need more treatment if your injury is severe or if your symptoms don't get better. This additional treatment may include local injections, physical therapy, and surgery.  When to seek medical advice  Call your health care provider right away if any of these occur:    Increase in pain or swelling    Symptoms get worse after taking medicine    Pain spreads farther down the thumb or into the forearm    Pain continues to get in the way of daily life    7768-7445 [employer]. [address],  [place] [address]. All rights reserved. This information is not intended as a substitute for professional medical care. Always follow your healthcare professional's instructions.        Treating De Quervain Tenosynovitis  The goal of your treatment is to relieve your pain and allow you to use your thumb again. Treatment will depend on how severe the pain is.  Nonsurgical Treatment  Just taking a break from the activities that caused your pain may be enough. Your healthcare provider may also have you take oral nonsteroidal, anti-inflammatory medicine (NSAIDs), such as ibuprofen, or wear a splint for a few weeks to rest the thumb and wrist. To reduce the swelling, your healthcare provider may inject an anti-inflammatory medicine, such as cortisone, around the tendons. You may have more pain at first, but in a few days your thumb should feel better.    Surgery  If other kinds of treatment don t relieve your pain, or if the pain is severe, your healthcare provider may recommend surgery. The sheath that surrounds the tendons is released so the tendons can move more easily. This helps reduce the inflammation, and allows you to straighten your thumb without pain. Usually, surgery takes a few minutes and is done with local anesthetic, so you can go home the same day. You will probably have a splint or dressing on your wrist for a few days while the tissue heals. Your healthcare provider will discuss the risks and possible complications of surgery with you.    8390-6416 [employer]. [address] [place] [address], [place] [address]. All rights reserved. This information is not intended as a substitute for professional medical care. Always follow your healthcare professional's instructions.

## 2017-05-18 NOTE — NURSING NOTE
"Chief Complaint   Patient presents with     Physical     Thumb Discomfort       Initial /82 (BP Location: Right arm, Cuff Size: Adult Large)  Pulse 76  Temp 97.9  F (36.6  C) (Oral)  Ht 5' 5\" (1.651 m)  Wt 181 lb (82.1 kg)  SpO2 96%  BMI 30.12 kg/m2 Estimated body mass index is 30.12 kg/(m^2) as calculated from the following:    Height as of this encounter: 5' 5\" (1.651 m).    Weight as of this encounter: 181 lb (82.1 kg).  Medication Reconciliation: complete Yudith Parekh MA    Screening Questionnaire for Adult Immunization    Are you sick today?   No   Do you have allergies to medications, food, a vaccine component or latex?   No   Have you ever had a serious reaction after receiving a vaccination?   No   Do you have a long-term health problem with heart disease, lung disease, asthma, kidney disease, metabolic disease (e.g. diabetes), anemia, or other blood disorder?   No   Do you have cancer, leukemia, HIV/AIDS, or any other immune system problem?   No   In the past 3 months, have you taken medications that affect  your immune system, such as prednisone, other steroids, or anticancer drugs; drugs for the treatment of rheumatoid arthritis, Crohn s disease, or psoriasis; or have you had radiation treatments?   Yes   Have you had a seizure, or a brain or other nervous system problem?   No     During the past year, have you received a transfusion of blood or blood     products, or been given immune (gamma) globulin or antiviral drug?   No   For women: Are you pregnant or is there a chance you could become        pregnant during the next month?   No   Have you received any vaccinations in the past 4 weeks?   No     Immunization questionnaire was positive for at least one answer.  Notified Dr Caal.      MNVFC doesn't apply on this patient    Per orders of Dr. Caal, injection of Tdapgiven by Yudith Parekh. Patient instructed to remain in clinic for 20 minutes afterwards, and to report any adverse " reaction to me immediately.       Screening performed by Yudith Parekh on 5/18/2017 at 1:33 PM.  Yudith Parekh MA

## 2017-08-07 NOTE — MR AVS SNAPSHOT
After Visit Summary   8/7/2017    Tadeo Smith    MRN: [id number]           Patient Information     Date Of Birth          1960        Visit Information        Provider Department      8/7/2017 8:20 AM Leonardo Harris DO [facility]        Today's Diagnoses     Pain of right thumb    -  1    Trigger thumb of right hand          Care Instructions    Thank you for allowing us to participate in your care today.  Please find below your visit diagnosis and the plan going forward.    1. Pain of right thumb    2. Trigger thumb of right hand      Reviewed ultrasound findings  Hand therapy: [facility] - [phone]  If no improvement with therapy can pursue surgical release    [facility] if you want to pursue surgical referral. Call direct clinic number [[phone]] at any time with questions or concerns.    Leonardo Harris DO [facility]  Website: [email or web address]  Twitter: [email or web address]            Follow-ups after your visit        Additional Services     [facility] PT, HAND, AND CHIROPRACTIC REFERRAL       **This order will print in the [facility] Scheduling Office**    Physical Therapy, Hand Therapy and Chiropractic Care are available through:    *[facility]  *[facility]  *[facility]    Call one number to schedule at any of the above locations: [phone].    Your provider has referred you to: Hand Therapy    Indication/Reason for Referral: R trigger thumb. Thickening of A1 pulley on ultrasound.  Onset of Illness: see chart  Therapy Orders: Evaluate and Treat  Special Programs: None  Special Request: None    Hira Iniguez      Additional Comments for the Therapist or Chiropractor:     Please be aware that coverage of these services is subject to the terms and limitations of your health insurance plan.  Call member services at your health plan with any  "benefit or coverage questions.      Please bring the following to your appointment:    *Your personal calendar for scheduling future appointments  *Comfortable clothing                  Your next 10 appointments already scheduled     Jan 08, 2018  1:30 PM CST   (Arrive by 1:00 PM)   Return Kidney Transplant with Ron Weinstein MD   [facility] Nephrology ([facility] [place])    [address]  [address] [place] [address] [phone]              Who to contact     If you have questions or need follow up information about today's clinic visit or your schedule please contact [facility] SP[facility]TS MEDICINE directly at [phone].  Normal or non-critical lab and imaging results will be communicated to you by MyChart, letter or phone within 4 business days after the clinic has received the results. If you do not hear from us within 7 days, please contact the clinic through "[employer]hart or phone. If you have a critical or abnormal lab result, we will notify you by phone as soon as possible.  Submit refill requests through [employer] or call your pharmacy and they will forward the refill request to us. Please allow 3 business days for your refill to be completed.          Additional Information About Your Visit        "[employer] Information     [employer] gives you secure access to your electronic health record. If you see a primary care provider, you can also send messages to your care team and make appointments. If you have questions, please call your primary care clinic.  If you do not have a primary care provider, please call [phone] and they will assist you.        Care EveryWhere ID     This is your Care EveryWhere ID. This could be used by other organizations to access your [facility] medical records  [id number]        Your Vitals Were     Height BMI (Body Mass Index)                5' 5\" (1.651 m) 29.95 kg/m2           Blood Pressure from Last 3 Encounters:   08/07/17 124/82 "   05/18/17 132/82   01/09/17 (!) 142/98    Weight from Last 3 Encounters:   08/07/17 180 lb (81.6 kg)   05/18/17 181 lb (82.1 kg)   01/09/17 182 lb (82.6 kg)              We Performed the Following     [facility] PT, HAND, AND CHIROPRACTIC REFERRAL        Primary Care Provider Office Phone # Fax #    Jodi Caal [phone]       [facility] [address] [place] [address]        Equal Access to Services     [facility]: Hadii aad ku hadasho Soomaali, waaxda luqadaha, qaybta kaalmada adeegyalambert, osito flanagan . So [facility] [phone].    ATENCIÓN: Si habla español, tiene a rm disposición servicios gratuitos de asistencia lingüística. [facility] [phone].    We comply with applicable federal civil rights laws and [place] laws. We do not discriminate on the basis of race, color, national origin, age, disability sex, sexual orientation or gender identity.            Thank you!     Thank you for choosing [facility]  for your care. Our goal is always to provide you with excellent care. Hearing back from our patients is one way we can continue to improve our services. Please take a few minutes to complete the written survey that you may receive in the mail after your visit with us. Thank you!             Your Updated Medication List - Protect others around you: Learn how to safely use, store and throw away your medicines at www.disposemymeds.org.          This list is accurate as of: 8/7/17  9:08 AM.  Always use your most recent med list.                   Brand Name Dispense Instructions for use Diagnosis    atorvastatin 10 MG tablet    LIPITOR    90 tablet    Take 1 tablet (10 mg) by mouth daily    Mixed hyperlipidemia       calcium + D 600-200 MG-UNIT Tabs   Generic drug:  calcium carbonate-vitamin D      Take 2 tablets by mouth 2 times daily.        losartan 100 MG tablet    COZAAR    90 tablet    Take 1 tablet (100 mg) by  mouth daily    Hypertension secondary to other renal disorders (CODE)       magnesium oxide 400 MG tablet    MAG-OX     Take 1 tablet by mouth daily.        metoprolol 25 MG tablet    LOPRESSOR    180 tablet    TAKE ONE TABLET BY MOUTH TWICE DAILY    Hypertension       multivitamin per tablet      Take 1 tablet by mouth daily.        mycophenolic acid EC tablet     180 tablet    Take 1 tablet (360 mg) by mouth 2 times daily    Kidney replaced by transplant       sirolimus 1 MG tablet    RAPAMUNE - GENERIC EQUIVALENT    90 tablet    Take 1 tablet (1 mg) by mouth daily    Kidney replaced by transplant, Shingles, Immunosuppressed status (H)

## 2017-08-07 NOTE — NURSING NOTE
"Chief Complaint   Patient presents with     Musculoskeletal Problem     right thumb       Initial /82  Ht 5' 5\" (1.651 m)  Wt 180 lb (81.6 kg)  BMI 29.95 kg/m2 Estimated body mass index is 29.95 kg/(m^2) as calculated from the following:    Height as of this encounter: 5' 5\" (1.651 m).    Weight as of this encounter: 180 lb (81.6 kg).  Medication Reconciliation: complete     Chris Waters ATC    "

## 2017-08-07 NOTE — PATIENT INSTRUCTIONS
Thank you for allowing us to participate in your care today.  Please find below your visit diagnosis and the plan going forward.    1. Pain of right thumb    2. Trigger thumb of right hand      Reviewed ultrasound findings  Hand therapy: [facility] - [phone]  If no improvement with therapy can pursue surgical release    Follow up over MyChart if you want to pursue surgical referral. Call direct clinic number [[phone]] at any time with questions or concerns.    Leonardo Harris DO [facility]  [facility]  Website: [email or web address]  Twitter: [email or web address]

## 2017-08-07 NOTE — PROGRESS NOTES
"ASSESSMENT & PLAN    ICD-10-CM    1. Pain of right thumb M79.644 XR Hand Right G/E 3 Views   2. Trigger thumb of right hand M65.311 [facility], HAND, AND CHIROPRACTIC REFERRAL   Reviewed ultrasound images consistent with trigger thumb.  Grade IV trigger  Explained dequervian's and why this does not fit his clinical picture  Discussed trail of hand therapy v surgical referral and wishes to proceed with hand therapy initially.  If not improved, will refer for surgical release    [facility] if [facility]. Call direct clinic number [phone] at any time with questions or concerns. Instructed to call the office if the condition evolves or worsens.    -----    SUBJECTIVE  Tadeo Smith is a/an 57 year old right hand dominant male who is seen in consultation at the request of Dr. Caal for evaluation of right thumb IP joint pain. The patient is seen by themselves.    Onset: 3-4 month(s) ago. Reports insidious onset without acute precipitating event.  Worsened by: bending, gripping  Better with: rest, vertical mouse, thumb splint  Quality: aching, dull (intermittent sharp pain)  Pain Scale (maximum/current)/10: 8/10 / 4/10  Treatments tried: ice and self traction  Orthopedic history: NO  Relevant surgical history: NO  Patient Social History: works at a computer, was a  at one point    Patient's past medical, surgical, social, and family histories were reviewed today and no changes are noted.    REVIEW OF SYSTEMS:  10 point ROS is negative other than symptoms noted above in HPI, Past Medical History or as stated below  Constitutional: NEGATIVE for fever, chills, change in weight  Skin: NEGATIVE for worrisome rashes, moles or lesions  GI/: NEGATIVE for bowel or bladder changes  Neuro: NEGATIVE for weakness, dizziness or paresthesias    OBJECTIVE:  /82  Ht 5' 5\" (1.651 m)  Wt 180 lb (81.6 kg)  BMI 29.95 kg/m2   General: healthy, alert and in no distress  HEENT: no scleral icterus or " conjunctival erythema  Skin: no suspicious lesions or rash. No jaundice.  CV: regular rhythm by palpation  Resp: normal respiratory effort without conversational dyspnea   Psych: normal mood and affect  Gait: normal steady gait with appropriate coordination and balance  Neuro: normal light touch sensory exam of the right hands.    MSK:  RIGHT HAND  Inspection:    No swelling or obvious deformity or asymmetry  Palpation:   Tender over A1 pulley. Nontender at IP and CMC. Nontender over 1st dorsal compartment  Range of Motion:    Unable to actively flex at IP joint.    Strength:    intact  Special Tests:    Positive: thickening appreciated at A1 pulley    Independent visualization of the below image:  Recent Results (from the past 24 hour(s))   XR Hand Right G/E 3 Views    Narrative    RIGHT HAND THREE OR MORE VIEWS  8/7/2017 8:43 AM     HISTORY: Pain in right finger(s).      Impression    IMPRESSION: Extensive atherosclerotic calcification at the wrist and  base of the thumb. No apparent fracture or dislocation. Joint space  widths appear within normal limits.    ERIK MCCAULEY MD     Limited US scan of the right 1st flexor tendon  History: Pain   Findings: Scan at the A1 pulley of the right thumb shows thickening of pulley without any focal tendon nodules. The distal insertion is intact at the distal phalanx.  Dynamic scan of the flexor tendon shows catching at the A1 pulley.   Interpretation: 1) Right thumb trigger finger    Patient's conditions were thoroughly discussed during today's visit with greater than 50% of the visit spent counseling the patient with total time spent face-to-face with the patient being 25 minutes with US scan taking 5 minutes.    Leonardo Harris, DO [facility] Orthopedic [facility]

## 2017-08-10 NOTE — PROGRESS NOTES
Hand Therapy Initial Evaluation  Current Date:  8/10/2017    Subjective:  Tadeo Smith is a 57 year old right hand dominant male.    Diagnosis: R thumb trigger  DOI:  May 2017 (MD order date 8/7/17)    Patient reports symptoms of pain, stiffness/loss of motion, weakness/loss of strength, edema (initially), numbness and tingling of the right thumb which occurred due to overuse. Since onset symptoms are unchanged. Special tests:  Guided US (thickening of A1 pulley), X-ray: calcification of veins (per pt report).  Previous treatment: OTC brace (caused some paresthesias so went to a softer one). General health as reported by patient is good.  Pertinent medical history includes: stroke, kidney disease, renal transplant (2007).  Medical allergies: none.  Surgical history: orthopedic: R knee, L shoulder, other: kidney transplant.  Medication history: high blood pressure, immunosuppressives.    Occupational Profile Information:  Current occupation is [employer]  Currently working in normal job without restrictions  Job Tasks: prolonged sitting, computer work  Prior functional level:  no limitations  Barriers include:none  Mobility: No difficulty  Transportation: drives  Leisure activities/hobbies: Pt has a side by side--steering with a knob now, shooting guns/pistols  Other: limited function with left hand after stroke    Upper Extremity Functional Index Score:  SCORE:   Column Totals: /80: 66   (A lower score indicates greater disability.)    O:  Pain Level Report: On scale 0-10/10  Date 8/10/2017    Side R    Overall 2    At Rest 0    With Activity 8      Primary Report: location and description  Date 8/10/2017    Side R    Location Volar MP and IP of thumb    Radiation Today ache in  Volar wrist    Pain Quality Dull ache, twinges    Frequency Intermittent    Duration End of day    Exacerbated by  Gripping,  Pinching, writing    Relieved by Distraction    Progression since onset Unchanged      Range of Motion Finger AROM  (PROM):  Date 8/10/2017    Side R    MP ext 0    MP flex 47    IP ext +    IP flex 1    PABD 55    RABD 55      Tenderness: pain scale of 0-10/10  Date 8/10/2017    Side R    Volar MP 6-7    IP 0    CMC 2-3      Stage of Stenosing Tenosynovitis (SST):     8/10/2017   Triggering of thumb 2   Stage 1:  Normal  Stage 2:  Uneven motion of tendon  Stage 3:  Triggering, clicking, catching  Stage 4:  Locking in extension or flexion; unlocked by active motion  Stage 5:  Locking in extension or flexion; unlocked by passive motion  Stage 6:  Finger locked in extension or flexion    STRENGTH: (Measured in pounds, pain scale 0-10/10)    Date 8/10/2017        Trials Left Right Left Right Left Right Left Right Left Right Left Right   1 27 88             2               3               Avg               Pain                 3 Point Pinch  Date 8/10/2017        Trials Left Right Left Right Left Right Left Right Left Right Left Right   1  14             2               3               Avg               Pain  3-4               Lateral Pinch  Date 8/10/2017        Trials Left Right Left Right Left Right Left Right Left Right Left Right   1  16             2               3               Avg               Pain                 Assessment:  Patient presents with symptoms consistent with diagnosis of right trigger thumb,  with conservative intervention.     Patient's limitations or Problem List includes:  Pain, Decreased ROM/motion, Weakness, Decreased  and pinch and Tightness in musculature of the right thumb which interferes with the patient's ability to perform Self Care Tasks (dressing), Household Chores and Driving  as compared to previous level of function.    Rehab Potential:  Excellent - Return to full activity, no limitations    Patient will benefit from skilled Occupational Therapy to increase ROM, flexibility,  strength and pinch strength and decrease pain to return to previous activity level and resume normal daily  tasks and to reach their rehab potential.    Barriers to Learning:  No barrier    Communication Issues:  Patient appears to be able to clearly communicate and understand verbal and written communication and follow directions correctly.    Assessment of Occupational Performance:  1-3 Performance Deficits  Identified Performance Deficits: dressing, driving and community mobility and home establishment and management      Clinical Decision Making (Complexity): Low complexity    Treatment Explanation:  The following has been discussed with the patient:    RX ordered/plan of care  Anticipated outcomes  Possible risks and side effects    P: Frequency:  1 X week, once daily  Duration:  for 6 weeks    Treatment Plan:    Modalities:  US   Therapeutic Exercise:   AROM of fingers,  Tendon gliding with specific stretch to the intrinsics  PROM with stretch to wrist extensors and flexors,  Neuro:  K-tape to the extensors  Manual Techniques:   Myofascial release of the forearm extensors and flexors and intrinsics  CMC mobilization of the affected finger  Orthotics:  MCP block, static extension    Home Program:   Exercise:   PROM with stretch to wrist extensors and flexors  MFR to thumb flexors and extensors    Discharge Plan:    Achieve all LTG.  Independent in home treatment program.  Reach maximal therapeutic benefit.    Next Visit:  US--continuous to decrease tightness at A1 pulley  MFR to thumb flexors and EPL  PROM thumb flexion and extension

## 2017-08-14 PROBLEM — M65.311 TRIGGER FINGER OF RIGHT THUMB: Status: ACTIVE | Noted: 2017-01-01

## 2017-08-14 PROBLEM — M79.644 THUMB PAIN, RIGHT: Status: ACTIVE | Noted: 2017-01-01

## 2017-10-13 NOTE — TELEPHONE ENCOUNTER
Medical Examiner's Office reporting pt found dead at 1104 10/13/17, suspected to have  several days ago. Can be reached at [phone]. Sent to neph pool.